# Patient Record
Sex: FEMALE | Race: ASIAN | Employment: UNEMPLOYED | URBAN - METROPOLITAN AREA
[De-identification: names, ages, dates, MRNs, and addresses within clinical notes are randomized per-mention and may not be internally consistent; named-entity substitution may affect disease eponyms.]

---

## 2021-08-22 ENCOUNTER — HOSPITAL ENCOUNTER (INPATIENT)
Facility: CLINIC | Age: 31
LOS: 3 days | Discharge: HOME | DRG: 177 | End: 2021-08-25
Attending: PHYSICIAN ASSISTANT | Admitting: INTERNAL MEDICINE
Payer: COMMERCIAL

## 2021-08-22 ENCOUNTER — APPOINTMENT (OUTPATIENT)
Dept: CT IMAGING | Facility: CLINIC | Age: 31
DRG: 177 | End: 2021-08-22
Attending: PHYSICIAN ASSISTANT
Payer: COMMERCIAL

## 2021-08-22 DIAGNOSIS — J96.01 ACUTE RESPIRATORY FAILURE WITH HYPOXIA (H): ICD-10-CM

## 2021-08-22 DIAGNOSIS — U07.1 PNEUMONIA DUE TO 2019 NOVEL CORONAVIRUS: ICD-10-CM

## 2021-08-22 DIAGNOSIS — J12.82 PNEUMONIA DUE TO 2019 NOVEL CORONAVIRUS: ICD-10-CM

## 2021-08-22 LAB
ABO/RH(D): NORMAL
ALBUMIN SERPL-MCNC: 3.4 G/DL (ref 3.4–5)
ALP SERPL-CCNC: 60 U/L (ref 40–150)
ALT SERPL W P-5'-P-CCNC: 105 U/L (ref 0–50)
ANION GAP SERPL CALCULATED.3IONS-SCNC: 7 MMOL/L (ref 3–14)
AST SERPL W P-5'-P-CCNC: 110 U/L (ref 0–45)
B-HCG FREE SERPL-ACNC: <5 IU/L (ref 0–5)
BASOPHILS # BLD AUTO: 0 10E3/UL (ref 0–0.2)
BASOPHILS NFR BLD AUTO: 0 %
BILIRUB DIRECT SERPL-MCNC: <0.1 MG/DL (ref 0–0.2)
BILIRUB SERPL-MCNC: 0.6 MG/DL (ref 0.2–1.3)
BUN SERPL-MCNC: 4 MG/DL (ref 7–30)
CALCIUM SERPL-MCNC: 9 MG/DL (ref 8.5–10.1)
CHLORIDE BLD-SCNC: 101 MMOL/L (ref 94–109)
CO2 SERPL-SCNC: 26 MMOL/L (ref 20–32)
CREAT SERPL-MCNC: 0.68 MG/DL (ref 0.52–1.04)
D DIMER PPP FEU-MCNC: 1 UG/ML FEU (ref 0–0.5)
D DIMER PPP FEU-MCNC: 1.06 UG/ML FEU (ref 0–0.5)
EOSINOPHIL # BLD AUTO: 0 10E3/UL (ref 0–0.7)
EOSINOPHIL NFR BLD AUTO: 1 %
ERYTHROCYTE [DISTWIDTH] IN BLOOD BY AUTOMATED COUNT: 13.4 % (ref 10–15)
GFR SERPL CREATININE-BSD FRML MDRD: >90 ML/MIN/1.73M2
GLUCOSE BLD-MCNC: 151 MG/DL (ref 70–99)
GLUCOSE BLDC GLUCOMTR-MCNC: 310 MG/DL (ref 70–99)
HBA1C MFR BLD: 7.5 % (ref 0–5.6)
HCT VFR BLD AUTO: 43.6 % (ref 35–47)
HGB BLD-MCNC: 14 G/DL (ref 11.7–15.7)
HOLD SPECIMEN: NORMAL
IMM GRANULOCYTES # BLD: 0 10E3/UL
IMM GRANULOCYTES NFR BLD: 1 %
LYMPHOCYTES # BLD AUTO: 1.2 10E3/UL (ref 0.8–5.3)
LYMPHOCYTES NFR BLD AUTO: 18 %
MCH RBC QN AUTO: 25 PG (ref 26.5–33)
MCHC RBC AUTO-ENTMCNC: 32.1 G/DL (ref 31.5–36.5)
MCV RBC AUTO: 78 FL (ref 78–100)
MONOCYTES # BLD AUTO: 0.6 10E3/UL (ref 0–1.3)
MONOCYTES NFR BLD AUTO: 9 %
NEUTROPHILS # BLD AUTO: 4.7 10E3/UL (ref 1.6–8.3)
NEUTROPHILS NFR BLD AUTO: 71 %
NRBC # BLD AUTO: 0 10E3/UL
NRBC BLD AUTO-RTO: 0 /100
PLATELET # BLD AUTO: 228 10E3/UL (ref 150–450)
POTASSIUM BLD-SCNC: 3.6 MMOL/L (ref 3.4–5.3)
PROT SERPL-MCNC: 8.1 G/DL (ref 6.8–8.8)
RBC # BLD AUTO: 5.61 10E6/UL (ref 3.8–5.2)
SARS-COV-2 RNA RESP QL NAA+PROBE: POSITIVE
SODIUM SERPL-SCNC: 134 MMOL/L (ref 133–144)
SPECIMEN EXPIRATION DATE: NORMAL
TROPONIN I SERPL-MCNC: <0.015 UG/L (ref 0–0.04)
WBC # BLD AUTO: 6.5 10E3/UL (ref 4–11)

## 2021-08-22 PROCEDURE — 85025 COMPLETE CBC W/AUTO DIFF WBC: CPT | Performed by: PHYSICIAN ASSISTANT

## 2021-08-22 PROCEDURE — 84484 ASSAY OF TROPONIN QUANT: CPT | Performed by: PHYSICIAN ASSISTANT

## 2021-08-22 PROCEDURE — 250N000013 HC RX MED GY IP 250 OP 250 PS 637: Performed by: INTERNAL MEDICINE

## 2021-08-22 PROCEDURE — 84145 PROCALCITONIN (PCT): CPT | Performed by: INTERNAL MEDICINE

## 2021-08-22 PROCEDURE — XW033E5 INTRODUCTION OF REMDESIVIR ANTI-INFECTIVE INTO PERIPHERAL VEIN, PERCUTANEOUS APPROACH, NEW TECHNOLOGY GROUP 5: ICD-10-PCS | Performed by: INTERNAL MEDICINE

## 2021-08-22 PROCEDURE — 250N000011 HC RX IP 250 OP 636: Performed by: INTERNAL MEDICINE

## 2021-08-22 PROCEDURE — 99223 1ST HOSP IP/OBS HIGH 75: CPT | Mod: AI | Performed by: INTERNAL MEDICINE

## 2021-08-22 PROCEDURE — 36415 COLL VENOUS BLD VENIPUNCTURE: CPT | Performed by: INTERNAL MEDICINE

## 2021-08-22 PROCEDURE — 83036 HEMOGLOBIN GLYCOSYLATED A1C: CPT | Performed by: INTERNAL MEDICINE

## 2021-08-22 PROCEDURE — 86900 BLOOD TYPING SEROLOGIC ABO: CPT | Performed by: INTERNAL MEDICINE

## 2021-08-22 PROCEDURE — 84702 CHORIONIC GONADOTROPIN TEST: CPT

## 2021-08-22 PROCEDURE — 85379 FIBRIN DEGRADATION QUANT: CPT | Performed by: INTERNAL MEDICINE

## 2021-08-22 PROCEDURE — 93005 ELECTROCARDIOGRAM TRACING: CPT

## 2021-08-22 PROCEDURE — 80048 BASIC METABOLIC PNL TOTAL CA: CPT | Performed by: PHYSICIAN ASSISTANT

## 2021-08-22 PROCEDURE — 258N000003 HC RX IP 258 OP 636: Performed by: INTERNAL MEDICINE

## 2021-08-22 PROCEDURE — 96360 HYDRATION IV INFUSION INIT: CPT | Mod: 59

## 2021-08-22 PROCEDURE — 71275 CT ANGIOGRAPHY CHEST: CPT

## 2021-08-22 PROCEDURE — 250N000012 HC RX MED GY IP 250 OP 636 PS 637: Performed by: INTERNAL MEDICINE

## 2021-08-22 PROCEDURE — 36415 COLL VENOUS BLD VENIPUNCTURE: CPT | Performed by: PHYSICIAN ASSISTANT

## 2021-08-22 PROCEDURE — 250N000009 HC RX 250: Performed by: INTERNAL MEDICINE

## 2021-08-22 PROCEDURE — 250N000011 HC RX IP 250 OP 636: Performed by: PHYSICIAN ASSISTANT

## 2021-08-22 PROCEDURE — 250N000012 HC RX MED GY IP 250 OP 636 PS 637: Performed by: PHYSICIAN ASSISTANT

## 2021-08-22 PROCEDURE — 96361 HYDRATE IV INFUSION ADD-ON: CPT

## 2021-08-22 PROCEDURE — 99285 EMERGENCY DEPT VISIT HI MDM: CPT | Mod: 25

## 2021-08-22 PROCEDURE — 87635 SARS-COV-2 COVID-19 AMP PRB: CPT | Performed by: PHYSICIAN ASSISTANT

## 2021-08-22 PROCEDURE — C9803 HOPD COVID-19 SPEC COLLECT: HCPCS

## 2021-08-22 PROCEDURE — 85379 FIBRIN DEGRADATION QUANT: CPT | Performed by: PHYSICIAN ASSISTANT

## 2021-08-22 PROCEDURE — 120N000004 HC R&B MS OVERFLOW

## 2021-08-22 PROCEDURE — 82248 BILIRUBIN DIRECT: CPT | Performed by: INTERNAL MEDICINE

## 2021-08-22 PROCEDURE — 258N000003 HC RX IP 258 OP 636: Performed by: PHYSICIAN ASSISTANT

## 2021-08-22 RX ORDER — AMLODIPINE BESYLATE 5 MG/1
5 TABLET ORAL DAILY
COMMUNITY

## 2021-08-22 RX ORDER — ALBUTEROL SULFATE 90 UG/1
2 AEROSOL, METERED RESPIRATORY (INHALATION) 4 TIMES DAILY
Status: DISCONTINUED | OUTPATIENT
Start: 2021-08-22 | End: 2021-08-25 | Stop reason: HOSPADM

## 2021-08-22 RX ORDER — IOPAMIDOL 755 MG/ML
84 INJECTION, SOLUTION INTRAVASCULAR ONCE
Status: COMPLETED | OUTPATIENT
Start: 2021-08-22 | End: 2021-08-22

## 2021-08-22 RX ORDER — DEXTROSE MONOHYDRATE 25 G/50ML
25-50 INJECTION, SOLUTION INTRAVENOUS
Status: DISCONTINUED | OUTPATIENT
Start: 2021-08-22 | End: 2021-08-25 | Stop reason: HOSPADM

## 2021-08-22 RX ORDER — FENOFIBRATE 160 MG/1
160 TABLET ORAL DAILY
Status: DISCONTINUED | OUTPATIENT
Start: 2021-08-23 | End: 2021-08-25 | Stop reason: HOSPADM

## 2021-08-22 RX ORDER — DEXAMETHASONE 4 MG/1
8 TABLET ORAL ONCE
Status: COMPLETED | OUTPATIENT
Start: 2021-08-22 | End: 2021-08-22

## 2021-08-22 RX ORDER — HYDRALAZINE HYDROCHLORIDE 100 MG/1
100 TABLET, FILM COATED ORAL EVERY 8 HOURS
Status: DISCONTINUED | OUTPATIENT
Start: 2021-08-22 | End: 2021-08-22

## 2021-08-22 RX ORDER — AMLODIPINE BESYLATE 5 MG/1
5 TABLET ORAL DAILY
Status: DISCONTINUED | OUTPATIENT
Start: 2021-08-22 | End: 2021-08-25 | Stop reason: HOSPADM

## 2021-08-22 RX ORDER — ATORVASTATIN CALCIUM 40 MG/1
40 TABLET, FILM COATED ORAL DAILY
COMMUNITY

## 2021-08-22 RX ORDER — GLIPIZIDE 5 MG/1
5 TABLET, FILM COATED, EXTENDED RELEASE ORAL
Status: DISCONTINUED | OUTPATIENT
Start: 2021-08-23 | End: 2021-08-25 | Stop reason: HOSPADM

## 2021-08-22 RX ORDER — OXYCODONE AND ACETAMINOPHEN 5; 325 MG/1; MG/1
1 TABLET ORAL EVERY 4 HOURS PRN
Status: DISCONTINUED | OUTPATIENT
Start: 2021-08-22 | End: 2021-08-22

## 2021-08-22 RX ORDER — LIDOCAINE 40 MG/G
CREAM TOPICAL
Status: DISCONTINUED | OUTPATIENT
Start: 2021-08-22 | End: 2021-08-25 | Stop reason: HOSPADM

## 2021-08-22 RX ORDER — ASPIRIN 81 MG
100 TABLET, DELAYED RELEASE (ENTERIC COATED) ORAL DAILY
Status: DISCONTINUED | OUTPATIENT
Start: 2021-08-23 | End: 2021-08-22

## 2021-08-22 RX ORDER — NICOTINE POLACRILEX 4 MG
15-30 LOZENGE BUCCAL
Status: DISCONTINUED | OUTPATIENT
Start: 2021-08-22 | End: 2021-08-25 | Stop reason: HOSPADM

## 2021-08-22 RX ORDER — FENOFIBRATE 145 MG/1
145 TABLET, COATED ORAL DAILY
COMMUNITY

## 2021-08-22 RX ORDER — LISINOPRIL 40 MG/1
40 TABLET ORAL DAILY
Status: DISCONTINUED | OUTPATIENT
Start: 2021-08-22 | End: 2021-08-25 | Stop reason: HOSPADM

## 2021-08-22 RX ORDER — LABETALOL 100 MG/1
200 TABLET, FILM COATED ORAL EVERY 12 HOURS
Status: DISCONTINUED | OUTPATIENT
Start: 2021-08-22 | End: 2021-08-22

## 2021-08-22 RX ORDER — ATORVASTATIN CALCIUM 40 MG/1
40 TABLET, FILM COATED ORAL DAILY
Status: DISCONTINUED | OUTPATIENT
Start: 2021-08-22 | End: 2021-08-25 | Stop reason: HOSPADM

## 2021-08-22 RX ORDER — LISINOPRIL 40 MG/1
40 TABLET ORAL DAILY
COMMUNITY

## 2021-08-22 RX ORDER — GLIPIZIDE 5 MG/1
5 TABLET, FILM COATED, EXTENDED RELEASE ORAL DAILY
COMMUNITY

## 2021-08-22 RX ADMIN — REMDESIVIR 200 MG: 100 INJECTION, POWDER, LYOPHILIZED, FOR SOLUTION INTRAVENOUS at 23:48

## 2021-08-22 RX ADMIN — INSULIN ASPART 3 UNITS: 100 INJECTION, SOLUTION INTRAVENOUS; SUBCUTANEOUS at 22:28

## 2021-08-22 RX ADMIN — IOPAMIDOL 84 ML: 755 INJECTION, SOLUTION INTRAVENOUS at 16:58

## 2021-08-22 RX ADMIN — AMLODIPINE BESYLATE 5 MG: 5 TABLET ORAL at 22:28

## 2021-08-22 RX ADMIN — ENOXAPARIN SODIUM 40 MG: 40 INJECTION SUBCUTANEOUS at 22:29

## 2021-08-22 RX ADMIN — SODIUM CHLORIDE 50 ML: 9 INJECTION, SOLUTION INTRAVENOUS at 23:48

## 2021-08-22 RX ADMIN — ALBUTEROL SULFATE 2 PUFF: 90 AEROSOL, METERED RESPIRATORY (INHALATION) at 22:28

## 2021-08-22 RX ADMIN — DEXAMETHASONE 8 MG: 4 TABLET ORAL at 15:47

## 2021-08-22 RX ADMIN — ATORVASTATIN CALCIUM 40 MG: 40 TABLET, FILM COATED ORAL at 22:28

## 2021-08-22 RX ADMIN — AMOXICILLIN AND CLAVULANATE POTASSIUM 1 TABLET: 875; 125 TABLET, FILM COATED ORAL at 22:28

## 2021-08-22 RX ADMIN — LISINOPRIL 40 MG: 40 TABLET ORAL at 22:28

## 2021-08-22 RX ADMIN — SODIUM CHLORIDE 1000 ML: 9 INJECTION, SOLUTION INTRAVENOUS at 15:03

## 2021-08-22 ASSESSMENT — ENCOUNTER SYMPTOMS
SHORTNESS OF BREATH: 1
VOMITING: 0
COUGH: 1
FEVER: 0
NAUSEA: 0
ABDOMINAL PAIN: 0

## 2021-08-22 ASSESSMENT — MIFFLIN-ST. JEOR: SCORE: 1848.52

## 2021-08-22 NOTE — ED TRIAGE NOTES
Patient presents to the ED with cough and SOB. Symptoms since Thursday. Was seen at urgent care this morning and diagnosed with a right sided pneumonia. COVID swab pending. Started on azithromycin and cefdinir. Patient states is feeling worse now.

## 2021-08-22 NOTE — ED PROVIDER NOTES
History     Chief Complaint:  Cough      HPI   Kay Lopez is a 30 year old female who presents emergency department for the evaluation of cough and shortness of breath.  The patient reports that her symptoms began 3 days prior and have progressively worsened prompting her visit to urgent care.  The patient reports that she had a chest x-ray performed which revealed pneumonia.  She was started on azithromycin and cefdinir.  She states that upon returning home her symptoms worsened in severity prompting her to present to the emergency department.  At the time of the exam, the patient denied chest pain however, she reported shortness of breath, fatigue, and a persistent nonproductive cough.  She denied any additional medical concerns.    Review of Systems   Constitutional: Negative for fever.   Respiratory: Positive for cough and shortness of breath.    Cardiovascular: Negative for chest pain.   Gastrointestinal: Negative for abdominal pain, nausea and vomiting.   All other systems reviewed and are negative.    Allergies:  Azithromycin  Enalapril    Medications:    Zestril  Glucophage  Lipitor   Norvasc   Tricor  Glucotrol     Apresoline   Normodyne    Past Medical History:    Hypertension  Anaphylaxis  Hypertension  Hyperlipidemia  Obesity  Erythrocytosis  Type 2 diabetes  migranes     Past Surgical History:     section    Family History:    Sister: thyroid disease      Social History:  Presents alone.  Denied tobacco use.     Physical Exam     Patient Vitals for the past 24 hrs:   BP Temp Pulse Resp SpO2   21 1715 -- -- 111 -- 94 %   21 1645 (!) 136/90 -- 107 -- 93 %   21 1630 (!) 143/95 -- 109 -- 93 %   21 1615 (!) 142/90 -- 105 -- 94 %   21 1600 (!) 137/90 -- 108 -- 94 %   21 1530 (!) 155/101 -- 117 -- 96 %   21 1515 (!) 170/108 -- 108 -- 96 %   21 1500 (!) 160/97 -- 109 -- --   21 1445 136/79 -- 110 -- 94 %   21 1430 (!) 169/102 -- 113  -- 97 %   08/22/21 1320 (!) 167/101 99.8  F (37.7  C) 112 20 95 %       Physical Exam  Vitals signs and nursing note reviewed.   HENT:      Nose: Nose normal. No congestion or rhinorrhea.     Mouth/Throat:      Mouth: Mucous membranes are moist.      Pharynx: Oropharynx is clear. No oropharyngeal exudate or posterior oropharyngeal erythema.   Eyes:      General: No scleral icterus.     Extraocular Movements: Extraocular movements intact.      Conjunctiva/sclera: Conjunctivae normal.      Pupils: Pupils are equal, round, and reactive to light.   Cardiovascular:      Rate and Rhythm: Regular rhythm. Tachycardia.      Pulses: Normal pulses.      Heart sounds: Normal heart sounds.   Pulmonary:      Effort: Pulmonary effort is normal. Diminished breath sounds right lower lobe.   Abdominal:      General: Abdomen is flat. Bowel sounds are normal.      Palpations: Abdomen is soft.      Tenderness: There is no abdominal tenderness.   Musculoskeletal: Normal range of motion.      Right lower leg: No edema.      Left lower leg: No edema.   Skin:     General: Skin is warm and dry.   Neurological:      Mental Status: Alert. Speech normal. Responds appropriately to questions.   Psychiatric:         Mood and Affect: Mood normal.         Behavior: Behavior normal.     Emergency Department Course   ECG:  ECG taken at 1528, ECG read at 1535  Sinus tachycardia  Otherwise normal ECG   No significant change as compared to prior, dated 7/1/15.  Rate 110 bpm. IA interval 146 ms. QRS duration 98 ms. QT/QTc 354/479 ms. P-R-T axes 19 42 1.     Imaging:  CT Chest Pulmonary Embolism w Contrast   Final Result   IMPRESSION:   1.  Areas of groundglass opacity and developing consolidation most prominent in the right lower lobe with right hilar adenopathy likely infectious or inflammatory. Follow-up recommended in 3 months to ensure resolution and exclude other underlying    pathology.   2.  Hepatic steatosis.   3.  No pulmonary embolus, aortic  aneurysm or dissection.      Commonly reported imaging features of (COVID-19) pneumonia are present. Influenza pneumonia and organizing pneumonia as can be seen in the setting of drug toxicity and connective tissue disease can cause a similar imaging pattern.          Laboratory:  Ddimer: 1.6 (H)  BMP: urea nitrogen 4 (L), glucose 151 (H) o/w WNL (Creatinine 0.68)   CBC: WBC 6.5, HGB 14,   Troponin (Collected 1500): <0.015  Symptomatic SARS-CoV2 (COVID19) Virus PCR Multiplex: Positive   HCG quantitative pregnancy POCT: <5    Emergency Department Course:    Reviewed:  I reviewed nursing notes, vitals, past history and care everywhere    Assessments:  1526 I obtained history and examined the patient as noted above.   1647 I rechecked the patient and explained findings.   1750 I staffed the patient with Dr. Reaves.       Consults:   1832   I spoke with Dr. Orozco of the hospitalist service who graciously accepted the patient for admission.     Interventions:  Medications   0.9% sodium chloride BOLUS (1,000 mLs Intravenous New Bag 8/22/21 1503)   dexamethasone (DECADRON) tablet 8 mg (8 mg Oral Given 8/22/21 1547)   iopamidol (ISOVUE-370) solution 84 mL (84 mLs Intravenous Given 8/22/21 1658)   sodium chloride (PF) 0.9% PF flush 100 mL (100 mLs Intravenous Given 8/22/21 1658)       Disposition:  The patient was admitted to the hospital under the care of Dr. Orozco.    Impression & Plan           Medical Decision Making:  Kay Lopez is a-year-old female who presents to the emergency department for evaluation of a persistent cough and shortness of breath beginning 3 days prior.  Vitals reviewed.  Patient afebrile however she was found to be tachycardic.  EKG revealed sinus tachycardia without evidence of acute ischemic changes.  Troponin negative.  CBC demonstrated no evidence of leukocytosis.  Hemoglobin stable at 14.0.  BMP reveals a nonfasting glucose of 151 otherwise unremarkable.  Covid is positive.  Given  tachycardia and low oxygen saturation, a D-dimer was obtained which unfortunately was elevated.  CT PE study was negative for PE, aortic aneurysm, or dissection however there was evidence of groundglass opacities and developing consolidation most prominent in the right lower lobe.  An ambulatory trial was performed and patient's oxygen saturation dropped to the low 90s and patient became increasingly tachycardic and more short of breath.  I spoke with Dr. Kim of the hospitalist team who graciously excepted the patient for admission given evidence of acute respiratory failure in the setting of Covid pneumonia.  While in the emergency department, the patient was given Decadron.  All questions and concerns were addressed prior to admission.    This note was completed in part using Dragon voice recognition software. Although reviewed after completion, some word and grammatical errors may occur.     Covid-19  Kay Lopez was evaluated during a global COVID-19 pandemic, which necessitated consideration that the patient might be at risk for infection with the SARS-CoV-2 virus that causes COVID-19.   Applicable protocols for evaluation were followed during the patient's care.   COVID-19 was considered as part of the patient's evaluation. The plan for testing is:  a test was obtained during this visit. Covid positive.     Diagnosis:    ICD-10-CM    1. Acute respiratory failure with hypoxia (H)  J96.01    2. Pneumonia due to 2019 novel coronavirus  U07.1     J12.82        Discharge Medications:  New Prescriptions    No medications on file         Scribe Disclosure:  Nav LEHMAN, am serving as a scribe at 2:08 PM on 8/22/2021 to document services personally performed by Katelynn Liu PA-C  based on my observations and the provider's statements to me.      Katelynn Liu PA-C  08/22/21 0682

## 2021-08-22 NOTE — H&P
[facility]    History and Physical - Hospitalist Service       Date of Admission:  8/22/2021    Assessment & Plan      Kay Lopez is a 30 year old female admitted on 8/22/2021.     COVID-19 infection  Bilateral pneumonia due to COVID-19 infection  Patient was diagnosed with Covid on  We will start the patient on Remdesivir and Dexamethazone with Lung infiltrates though the Hypoxia was only once less than 94%  Pro-calcitonin will be checked and if the level is normal I will discontinue Augmentin that was already started     Hypertension  Continue home dose of hydralazine 100 mg every 8 hours as well as labetalol 200 mg every 12 hours    Type 2 diabetes  Sliding scale insulin will be given    migranes   No headache at this time    Hyperlipidemia  Obesity         Diet:  Diabetic diet  DVT Prophylaxis: Enoxaparin (Lovenox) SQ  Wilkerson Catheter: Not present  Central Lines: None  Code Status:  Full code    Clinically Significant Risk Factors Present on Admission                       Bhanu Orozco MD  [facility]  Securely message with the Vocera Web Console (learn more here)  Text page via [employer] Paging/Directory      ______________________________________________________________________    Chief Complaint   Hartness of breath    History is obtained from the patient's  (because of language barrier with the patient), medical records and my discussion with the emergency department physician.    History of Present Illness   Kay Lopez is a 30 year old lady who has vaccinated for COVID-19 infection on March 15 and April 12.  She has history of hypertension, hypercholesteremia, obesity, type 2 diabetes mellitus and migraine    For the past 4 days patient has had cough with aches and pains in the body.  She has associated shortness of breath associated with the cough which was progressively getting worse.  This prompted the patient to go to urgent care earlier in  the morning at [facility].  X-ray was done which showed left lower lobe pneumonia a Covid swab was checked and patient was sent home on azithromycin and cefdinir.  At home health cough was significantly getting worse and so was her shortness of breath because of which she was brought again to the emergency department at [facility].     Here her COVID-19 test came back positive, creatinine was 0.68, WBC 6.5, D-dimer 1.06 and CT scan of the chest showed areas of groundglass opacities and developing consolidation most prominent in the right lower lobe with right hilar adenopathy likely infectious or inflammatory.         Review of Systems    Unable to get review of systems from the patient because of language barrier.     Past Medical History    I have reviewed this patient's medical history and updated it with pertinent information if needed.   Past Medical History:   Diagnosis Date     Hypertension     history of in [place], took medicine then, no medicine in last 2 [place] the states       Past Surgical History   I have reviewed this patient's surgical history and updated it with pertinent information if needed.  Past Surgical History:   Procedure Laterality Date      SECTION N/A 2015    Procedure:  SECTION;  Surgeon: Clifton Lopez MD;  Location:  L+D     GYN SURGERY             Social History   I have reviewed this patient's social history and updated it with pertinent information if needed.  Social History     Tobacco Use     Smoking status: Never Smoker   Substance Use Topics     Alcohol use: No     Drug use: Not on file       Family History   No one is sick in the family with Covid    Prior to Admission Medications   Prior to Admission Medications   Prescriptions Last Dose Informant Patient Reported? Taking?   Lactobacillus (ACIDOPHILUS)   No No   Sig: Take 1 tablet by mouth 2 times daily   Misc. Devices (BREAST PUMP) MISC   No No   Si each  as needed   amoxicillin-clavulanate (AUGMENTIN) 875-125 MG per tablet   No No   Sig: Take 1 tablet by mouth 2 times daily   docusate sodium (COLACE) 100 MG tablet   No No   Sig: Take 100 mg by mouth daily   hydrALAZINE (APRESOLINE) 100 MG TABS   No No   Sig: Take 1 tablet (100 mg) by mouth every 8 hours   ibuprofen (ADVIL,MOTRIN) 800 MG tablet   No No   Sig: Take 1 tablet (800 mg) by mouth every 6 hours as needed for moderate pain   labetalol (NORMODYNE) 200 MG tablet   No No   Sig: Take 1 tablet (200 mg) by mouth every 12 hours   oxyCODONE-acetaminophen (PERCOCET) 5-325 MG per tablet   No No   Sig: Take 1 tablet by mouth every 4 hours as needed for moderate to severe pain      Facility-Administered Medications: None     Allergies   Allergies   Allergen Reactions     Azithromycin      anaphylaxis       Physical Exam   Vital Signs: Temp: 99.8  F (37.7  C)   BP: (!) 136/90 Pulse: 111   Resp: 20 SpO2: 94 %      Weight: 0 lbs 0 oz        Physical Exam   Vital Signs: Temp: 98.8  F (37.1  C) Temp src: Oral BP: (!) 144/88 Pulse: 114   Resp: 20 SpO2: 94 % O2 Device: None (Room air)    Weight: 252 lbs 1.6 oz      GENERAL: Patient is not  in acute distress  HEENT:  EOM+,Conjunctiva is clear    NECK:   no Jugular Venous distention  HEART: S1 S2  regular Rate and Rhythm,  there is   no murmur,   LUNGS: Respirations are   not laboured, Lungs are   clear to auscultation  without Crepitations or Wheezing   ABDOMEN: Soft , there is no tenderness ,Bowel Sounds are  Positive   LOWER LIMBS: no   Pedal Edema   Bilaterally   CNS:   Alert,   Oriented x 3, Moving all the Four Limbs      Data   Recent Labs   Lab 08/22/21  1500   WBC 6.5   HGB 14.0   MCV 78         POTASSIUM 3.6   CHLORIDE 101   CO2 26   BUN 4*   CR 0.68   ANIONGAP 7   LAURA 9.0   *   TROPONIN <0.015         Recent Results (from the past 24 hour(s))   CT Chest Pulmonary Embolism w Contrast    Narrative    EXAM: CT CHEST PULMONARY EMBOLISM W  CONTRAST  LOCATION: [facility]  DATE/TIME: 8/22/2021 4:56 PM    INDICATION: Shortness of breath, cough, tachycardia.  COMPARISON: None.  TECHNIQUE: CT chest pulmonary angiogram during arterial phase injection of IV contrast. Multiplanar reformats and MIP reconstructions were performed. Dose reduction techniques were used.   CONTRAST: 84 mL Isovue-370.    FINDINGS:  ANGIOGRAM CHEST: Exam mildly compromised from body habitus and prominent motion artifact at the lung bases. Smaller pulmonary arteries obscured. No pulmonary emboli identified. Thoracic aorta is negative for dissection. No CT evidence of right heart   strain.    LUNGS AND PLEURA: Extensive groundglass opacity and developing consolidation most prominent in right lower lobe and to a much less extent the remainder of bilateral hemithoraces. No effusion.    MEDIASTINUM/AXILLAE: Mild right hilar adenopathy.    CORONARY ARTERY CALCIFICATION: None.    UPPER ABDOMEN: Hepatic steatosis.    MUSCULOSKELETAL: Normal.      Impression    IMPRESSION:  1.  Areas of groundglass opacity and developing consolidation most prominent in the right lower lobe with right hilar adenopathy likely infectious or inflammatory. Follow-up recommended in 3 months to ensure resolution and exclude other underlying   pathology.  2.  Hepatic steatosis.  3.  No pulmonary embolus, aortic aneurysm or dissection.    Commonly reported imaging features of (COVID-19) pneumonia are present. Influenza pneumonia and organizing pneumonia as can be seen in the setting of drug toxicity and connective tissue disease can cause a similar imaging pattern.         Data   Data reviewed today: I reviewed all medications, new labs and imaging results over the last 24 hours.           Most Recent 3 CBC's:Recent Labs   Lab Test 08/22/21  1500 07/03/15  0219 07/02/15  0519   WBC 6.5 17.0* 18.0*   HGB 14.0 10.5* 11.2*   MCV 78 75* 74*    365 331     Most Recent 3 BMP's:Recent Labs   Lab  Test 08/22/21  1500 07/04/15  0522 07/03/15  0219 07/02/15  1554 07/01/15  1921     --   --  142 141   POTASSIUM 3.6  --   --  3.8 3.3*   CHLORIDE 101  --   --  109 106   CO2 26  --   --  21 26   BUN 4*  --   --  10 7   CR 0.68 0.74 0.63 0.59 0.62   ANIONGAP 7  --   --  12 9   LAURA 9.0  --   --  8.3* 8.3*   *  --   --  128* 88     Most Recent 2 LFT's:Recent Labs   Lab Test 07/01/15  1921   AST 29   ALT 29   ALKPHOS 133   BILITOTAL 0.3     Most Recent D-dimer:Recent Labs   Lab Test 08/22/21  1500   DD 1.06*     Recent Results (from the past 24 hour(s))   CT Chest Pulmonary Embolism w Contrast    Narrative    EXAM: CT CHEST PULMONARY EMBOLISM W CONTRAST  LOCATION: [facility]  DATE/TIME: 8/22/2021 4:56 PM    INDICATION: Shortness of breath, cough, tachycardia.  COMPARISON: None.  TECHNIQUE: CT chest pulmonary angiogram during arterial phase injection of IV contrast. Multiplanar reformats and MIP reconstructions were performed. Dose reduction techniques were used.   CONTRAST: 84 mL Isovue-370.    FINDINGS:  ANGIOGRAM CHEST: Exam mildly compromised from body habitus and prominent motion artifact at the lung bases. Smaller pulmonary arteries obscured. No pulmonary emboli identified. Thoracic aorta is negative for dissection. No CT evidence of right heart   strain.    LUNGS AND PLEURA: Extensive groundglass opacity and developing consolidation most prominent in right lower lobe and to a much less extent the remainder of bilateral hemithoraces. No effusion.    MEDIASTINUM/AXILLAE: Mild right hilar adenopathy.    CORONARY ARTERY CALCIFICATION: None.    UPPER ABDOMEN: Hepatic steatosis.    MUSCULOSKELETAL: Normal.      Impression    IMPRESSION:  1.  Areas of groundglass opacity and developing consolidation most prominent in the right lower lobe with right hilar adenopathy likely infectious or inflammatory. Follow-up recommended in 3 months to ensure resolution and exclude other underlying    pathology.  2.  Hepatic steatosis.  3.  No pulmonary embolus, aortic aneurysm or dissection.    Commonly reported imaging features of (COVID-19) pneumonia are present. Influenza pneumonia and organizing pneumonia as can be seen in the setting of drug toxicity and connective tissue disease can cause a similar imaging pattern.

## 2021-08-22 NOTE — ED PROVIDER NOTES
Emergency Department Attending Supervision Note  8/22/2021  6:09 PM      I evaluated this patient in conjunction with Katelynn Liu PA-C      Briefly, the patient presented with cough and shortness of breath.  COVID +      Exam:    Gen: alert  CV: tachycardic regular rhythm, no murmurs, 2+ distal pulses in all 4 extremities    Pulm: breath sounds equal, lungs clear  MSK: no lower extremity edema, no calf tenderness  Neuro: alert, appropriate conversation and interaction        MDM and Plan:  Patient presents for cough and SOB.  Evaluation today showed respiratory failure due to COVID PNA.  PE study negative.  Pt with borderline desaturation with ambulation but significant tachycardia with ambulation even after IV fluids (on my bedside assessment  with standing at bedside).  Steroids given.  Plan for admission.      Diagnosis    ICD-10-CM    1. Acute respiratory failure with hypoxia (H)  J96.01    2. Pneumonia due to 2019 novel coronavirus  U07.1     J12.82             Tess Glaser MD  08/22/21 2009

## 2021-08-23 LAB
ANION GAP SERPL CALCULATED.3IONS-SCNC: 8 MMOL/L (ref 3–14)
ATRIAL RATE - MUSE: 110 BPM
BUN SERPL-MCNC: 10 MG/DL (ref 7–30)
CALCIUM SERPL-MCNC: 8.9 MG/DL (ref 8.5–10.1)
CHLORIDE BLD-SCNC: 106 MMOL/L (ref 94–109)
CO2 SERPL-SCNC: 21 MMOL/L (ref 20–32)
CREAT SERPL-MCNC: 0.6 MG/DL (ref 0.52–1.04)
CRP SERPL-MCNC: 46.8 MG/L (ref 0–8)
D DIMER PPP FEU-MCNC: 0.96 UG/ML FEU (ref 0–0.5)
DIASTOLIC BLOOD PRESSURE - MUSE: NORMAL MMHG
ERYTHROCYTE [DISTWIDTH] IN BLOOD BY AUTOMATED COUNT: 13.6 % (ref 10–15)
FIBRINOGEN PPP-MCNC: 677 MG/DL (ref 170–490)
GFR SERPL CREATININE-BSD FRML MDRD: >90 ML/MIN/1.73M2
GLUCOSE BLD-MCNC: 208 MG/DL (ref 70–99)
GLUCOSE BLDC GLUCOMTR-MCNC: 156 MG/DL (ref 70–99)
GLUCOSE BLDC GLUCOMTR-MCNC: 219 MG/DL (ref 70–99)
GLUCOSE BLDC GLUCOMTR-MCNC: 261 MG/DL (ref 70–99)
GLUCOSE BLDC GLUCOMTR-MCNC: 316 MG/DL (ref 70–99)
GLUCOSE BLDC GLUCOMTR-MCNC: 349 MG/DL (ref 70–99)
HCT VFR BLD AUTO: 41.5 % (ref 35–47)
HGB BLD-MCNC: 13.2 G/DL (ref 11.7–15.7)
HOLD SPECIMEN: NORMAL
INTERPRETATION ECG - MUSE: NORMAL
MCH RBC QN AUTO: 24.3 PG (ref 26.5–33)
MCHC RBC AUTO-ENTMCNC: 31.8 G/DL (ref 31.5–36.5)
MCV RBC AUTO: 76 FL (ref 78–100)
P AXIS - MUSE: 19 DEGREES
PLATELET # BLD AUTO: 232 10E3/UL (ref 150–450)
POTASSIUM BLD-SCNC: 4.3 MMOL/L (ref 3.4–5.3)
PR INTERVAL - MUSE: 146 MS
PROCALCITONIN SERPL-MCNC: <0.05 NG/ML
PROCALCITONIN SERPL-MCNC: <0.05 NG/ML
QRS DURATION - MUSE: 98 MS
QT - MUSE: 354 MS
QTC - MUSE: 479 MS
R AXIS - MUSE: 42 DEGREES
RBC # BLD AUTO: 5.43 10E6/UL (ref 3.8–5.2)
SODIUM SERPL-SCNC: 135 MMOL/L (ref 133–144)
SYSTOLIC BLOOD PRESSURE - MUSE: NORMAL MMHG
T AXIS - MUSE: 1 DEGREES
VENTRICULAR RATE- MUSE: 110 BPM
WBC # BLD AUTO: 10 10E3/UL (ref 4–11)

## 2021-08-23 PROCEDURE — 250N000013 HC RX MED GY IP 250 OP 250 PS 637: Performed by: INTERNAL MEDICINE

## 2021-08-23 PROCEDURE — 250N000009 HC RX 250: Performed by: INTERNAL MEDICINE

## 2021-08-23 PROCEDURE — 85027 COMPLETE CBC AUTOMATED: CPT | Performed by: INTERNAL MEDICINE

## 2021-08-23 PROCEDURE — 84145 PROCALCITONIN (PCT): CPT | Performed by: INTERNAL MEDICINE

## 2021-08-23 PROCEDURE — 85384 FIBRINOGEN ACTIVITY: CPT | Performed by: INTERNAL MEDICINE

## 2021-08-23 PROCEDURE — 250N000012 HC RX MED GY IP 250 OP 636 PS 637: Performed by: INTERNAL MEDICINE

## 2021-08-23 PROCEDURE — 99233 SBSQ HOSP IP/OBS HIGH 50: CPT | Performed by: INTERNAL MEDICINE

## 2021-08-23 PROCEDURE — 94640 AIRWAY INHALATION TREATMENT: CPT

## 2021-08-23 PROCEDURE — 85379 FIBRIN DEGRADATION QUANT: CPT | Performed by: INTERNAL MEDICINE

## 2021-08-23 PROCEDURE — 80048 BASIC METABOLIC PNL TOTAL CA: CPT | Performed by: INTERNAL MEDICINE

## 2021-08-23 PROCEDURE — 999N000157 HC STATISTIC RCP TIME EA 10 MIN

## 2021-08-23 PROCEDURE — 120N000004 HC R&B MS OVERFLOW

## 2021-08-23 PROCEDURE — 36415 COLL VENOUS BLD VENIPUNCTURE: CPT | Performed by: INTERNAL MEDICINE

## 2021-08-23 PROCEDURE — 94640 AIRWAY INHALATION TREATMENT: CPT | Mod: 76

## 2021-08-23 PROCEDURE — 250N000011 HC RX IP 250 OP 636: Performed by: INTERNAL MEDICINE

## 2021-08-23 PROCEDURE — 86140 C-REACTIVE PROTEIN: CPT | Performed by: INTERNAL MEDICINE

## 2021-08-23 PROCEDURE — 258N000003 HC RX IP 258 OP 636: Performed by: INTERNAL MEDICINE

## 2021-08-23 RX ORDER — BENZONATATE 100 MG/1
100 CAPSULE ORAL 3 TIMES DAILY PRN
Status: DISCONTINUED | OUTPATIENT
Start: 2021-08-23 | End: 2021-08-25 | Stop reason: HOSPADM

## 2021-08-23 RX ADMIN — ENOXAPARIN SODIUM 40 MG: 40 INJECTION SUBCUTANEOUS at 19:57

## 2021-08-23 RX ADMIN — REMDESIVIR 100 MG: 100 INJECTION, POWDER, LYOPHILIZED, FOR SOLUTION INTRAVENOUS at 17:51

## 2021-08-23 RX ADMIN — INSULIN HUMAN 10 UNITS: 100 INJECTION, SUSPENSION SUBCUTANEOUS at 14:04

## 2021-08-23 RX ADMIN — METFORMIN HYDROCHLORIDE 1000 MG: 500 TABLET, FILM COATED ORAL at 17:59

## 2021-08-23 RX ADMIN — GLIPIZIDE 5 MG: 5 TABLET, FILM COATED, EXTENDED RELEASE ORAL at 08:59

## 2021-08-23 RX ADMIN — ENOXAPARIN SODIUM 40 MG: 40 INJECTION SUBCUTANEOUS at 09:00

## 2021-08-23 RX ADMIN — AMOXICILLIN AND CLAVULANATE POTASSIUM 1 TABLET: 875; 125 TABLET, FILM COATED ORAL at 09:00

## 2021-08-23 RX ADMIN — DEXAMETHASONE 6 MG: 2 TABLET ORAL at 08:59

## 2021-08-23 RX ADMIN — LISINOPRIL 40 MG: 40 TABLET ORAL at 09:00

## 2021-08-23 RX ADMIN — SODIUM CHLORIDE 50 ML: 9 INJECTION, SOLUTION INTRAVENOUS at 17:53

## 2021-08-23 RX ADMIN — GUAIFENESIN 10 ML: 100 SOLUTION ORAL at 12:49

## 2021-08-23 RX ADMIN — FENOFIBRATE 160 MG: 160 TABLET, FILM COATED ORAL at 09:00

## 2021-08-23 RX ADMIN — BENZONATATE 100 MG: 100 CAPSULE ORAL at 17:59

## 2021-08-23 RX ADMIN — ALBUTEROL SULFATE 2 PUFF: 90 AEROSOL, METERED RESPIRATORY (INHALATION) at 15:47

## 2021-08-23 RX ADMIN — GUAIFENESIN 10 ML: 100 SOLUTION ORAL at 17:59

## 2021-08-23 RX ADMIN — AMLODIPINE BESYLATE 5 MG: 5 TABLET ORAL at 09:00

## 2021-08-23 RX ADMIN — ATORVASTATIN CALCIUM 40 MG: 40 TABLET, FILM COATED ORAL at 19:56

## 2021-08-23 RX ADMIN — ALBUTEROL SULFATE 2 PUFF: 90 AEROSOL, METERED RESPIRATORY (INHALATION) at 19:44

## 2021-08-23 RX ADMIN — ALBUTEROL SULFATE 2 PUFF: 90 AEROSOL, METERED RESPIRATORY (INHALATION) at 07:59

## 2021-08-23 RX ADMIN — ALBUTEROL SULFATE 2 PUFF: 90 AEROSOL, METERED RESPIRATORY (INHALATION) at 11:01

## 2021-08-23 NOTE — PLAN OF CARE
ROOM # 231    Living Situation (if not independent, order SW consult): With  and children  Facility name:  : [facility] ([phone])    Activity level at baseline: Ind  Activity level on admit: Ind      Patient registered to observation; given Patient Bill of Rights; given the opportunity to ask questions about observation status and their plan of care.  Patient has been oriented to the observation room, bathroom and call light is in place.    Discussed discharge goals and expectations with patient/family.

## 2021-08-23 NOTE — ED NOTES
[facility]  ED Nurse Handoff Report    Kay Lopez is a 30 year old female   ED Chief complaint: Cough  . ED Diagnosis:   Final diagnoses:   Acute respiratory failure with hypoxia (H)   Pneumonia due to 2019 novel coronavirus     Allergies:   Allergies   Allergen Reactions     Azithromycin      anaphylaxis       Code Status: Full Code  Activity level - Baseline/Home:  Independent. Activity Level - Current:   Stand by Assist. Lift room needed: No. Bariatric: No   Needed: Yes   Isolation: Yes. Infection: Not Applicable.     Vital Signs:   Vitals:    08/22/21 1845 08/22/21 1900 08/22/21 1915 08/22/21 1930   BP:       Pulse:       Resp:       Temp:       SpO2: 94% 94% 92% 93%       Cardiac Rhythm:  ,      Pain level:    Patient confused: No. Patient Falls Risk: Yes.   Elimination Status: Has voided   Patient Report - Initial Complaint: cough. Focused Assessment: cough that's increasingly getting more freq/dry/congested. A&O   Tests Performed: lab/imaging. Abnormal Results:   Labs Ordered and Resulted from Time of ED Arrival Up to the Time of Departure from the ED   BASIC METABOLIC PANEL - Abnormal; Notable for the following components:       Result Value    Urea Nitrogen 4 (*)     Glucose 151 (*)     All other components within normal limits   COVID-19 VIRUS (CORONAVIRUS) BY PCR - Abnormal; Notable for the following components:    SARS CoV2 PCR Positive (*)     All other components within normal limits    Narrative:     Testing was performed using the treasure  SARS-CoV-2 & Influenza A/B Assay on the treasure  Jennifer  System.  This test should be ordered for the detection of SARS-COV-2 in individuals who meet SARS-CoV-2 clinical and/or epidemiological criteria. Test performance is unknown in asymptomatic patients.  This test is for in vitro diagnostic use under the FDA EUA for laboratories certified under CLIA to perform moderate and/or high complexity testing. This test has not been FDA cleared  or approved.  A negative test does not rule out the presence of PCR inhibitors in the specimen or target RNA in concentration below the limit of detection for the assay. The possibility of a false negative should be considered if the patient's recent exposure or clinical presentation suggests COVID-19.  [facility] are certified under the Clinical Laboratory Improvement Amendments of 1988 (CLIA-88) as qualified to perform moderate and/or high complexity laboratory testing.   D DIMER QUANTITATIVE - Abnormal; Notable for the following components:    D-Dimer Quantitative 1.06 (*)     All other components within normal limits    Narrative:     This D-dimer assay is intended for use in conjunction with a clinical pretest probability assessment model to exclude pulmonary embolism (PE) and deep venous thrombosis (DVT) in outpatients suspected of PE or DVT. The cut-off value is 0.50 ug/mL FEU.   CBC WITH PLATELETS AND DIFFERENTIAL - Abnormal; Notable for the following components:    RBC Count 5.61 (*)     MCH 25.0 (*)     All other components within normal limits   ISTAT HCG QUANTITATIVE PREGNANCY POCT - Normal   TROPONIN I - Normal   CBC WITH PLATELETS & DIFFERENTIAL    Narrative:     The following orders were created for panel order CBC with platelets differential.  Procedure                               Abnormality         Status                     ---------                               -----------         ------                     CBC with platelets and d...[[id number]]  Abnormal            Final result                 Please view results for these tests on the individual orders.   EXTRA BLUE TOP TUBE   EXTRA RED TOP TUBE   EXTRA GREEN TOP (LITHIUM HEPARIN) TUBE   EXTRA PURPLE TOP TUBE   ISTAT HCG QUANTITATIVE PREGNANCY NURSING POCT   EXTRA TUBE    Narrative:     The following orders were created for panel order Carpenter Draw.  Procedure                               Abnormality         Status                      ---------                               -----------         ------                     Extra Blue Top Tube[[id number]]                              Final result               Extra Red Top Tube[[id number]]                               Final result               Extra Green Top (Lithium...[[id number]]                      Final result               Extra Purple Top Tube[[id number]]                            Final result                 Please view results for these tests on the individual orders.     .   Treatments provided: MAR  Family Comments: none  OBS brochure/video discussed/provided to patient:  N/A  ED Medications:   Medications   0.9% sodium chloride BOLUS (1,000 mLs Intravenous New Bag 8/22/21 1503)   dexamethasone (DECADRON) tablet 8 mg (8 mg Oral Given 8/22/21 1547)   iopamidol (ISOVUE-370) solution 84 mL (84 mLs Intravenous Given 8/22/21 1658)   sodium chloride (PF) 0.9% PF flush 100 mL (100 mLs Intravenous Given 8/22/21 1658)     Drips infusing:  Yes  For the majority of the shift, the patient's behavior Green. Interventions performed were .    Sepsis treatment initiated: No     Patient tested for COVID 19 prior to admission: YES    ED Nurse Name/Phone Number: Gayle Gillespie RN,   7:37 PM    RECEIVING UNIT ED HANDOFF REVIEW    Above ED Nurse Handoff Report was reviewed: Yes  Reviewed by: Echo Carver RN on August 22, 2021 at 8:07 PM

## 2021-08-23 NOTE — PLAN OF CARE
"Vitals: /64 (BP Location: Right arm)   Pulse 89   Temp 97.8  F (36.6  C) (Oral)   Resp 20   Ht 1.626 m (5' 4\")   Wt 114.4 kg (252 lb 1.6 oz)   SpO2 94%   BMI 43.27 kg/m      Neuro: WNL, A&O x4  Cardiac: WNL  Lungs: 1L O2, sats 92-94%  GI: WNL  : WNL  Skin: WNL  Activity: Ind  Diet: Regular  Pain: Denies  IV: Peripheral SL   Meds: Remdesivir, Decadron, lovenox, sliding scale insulin  Labs/tests: Chest CT shows COVID 19 pneumonia with ground glass opacities, worst in RLL  Misc: Peruvian speaking, requires    Plan: O2, IV Remdesivir, PO decadron, and symptom management. Will provide supportive care.      "

## 2021-08-23 NOTE — PROGRESS NOTES
Vitals: pt tachy 100s/110s, on 3L NC   Neuro: wnl   GI: wnl-denies diarrhea   : wnl  Skin:wnl  Activity: ind  Diet:Regular, family dropping off food. Denies lost of taste or smell. Tolerating diet.   Pain: denies   Plan: wean 02, remdesivir, oral decadron, scheduled inhalers, continue pulse ox

## 2021-08-23 NOTE — PROGRESS NOTES
[facility]  Hospitalist Progress Note  Parminder Daley MD 08/23/21    Reason for Stay (Diagnosis): COVID19 pneumonia         Assessment and Plan:      Summary of Stay: Kay Lopez is a 30 year old [place] speaking female with past medical history of type II DM, HTN, obesity, and HLD admitted on 8/22/2021 with 3 days of cough and shortness of breath and found to be positive for COVID-19.  Initially presented to a different ER and was prescribed cefdinir and azithromycin for pneumonia.  Return due to worsening symptoms.  D-dimer elevated at 1.  CTPA showed bilateral groundglass opacities, most prominent in the right lower lobe with right hilar adenopathy.  She was admitted due to hypoxia.  She has been started on IV Remdesevir and dexamethasone.    Problem List/Assessment and Plan:   Acute hypoxemic respiratory failure secondary to COVID-19 pneumonia: Ill for 3 days PTA.  Primary symptoms of cough and shortness of breath.  Positive for COVID-19 8/22.  CTPA shows bilateral groundglass opacities, most prominent in the right lower lobe consistent with COVID-19 pneumonia.  -Continue IV remdesivir day 2/5, mild elevation  and  on presentation, trend LFTs while on Remdesevir daily  -Continue oral dexamethasone 6 mg daily day 2/10  -Continue supplemental oxygen, wean as able, pulse oximetry  -Guaifenesin Robitussin as needed for cough  -Lovenox for DVT prophylaxis    Type II DM: PTA on Metformin 1000 mg twice daily and glipizide XL 5 mg daily.  A1c 7.5.  Risk for hyperglycemia with dexamethasone.  Tolerating p.o. now.  -Continue glipizide  -Resume Metformin this evening  -Medium dose sliding scale insulin  -Depending on degree of hyperglycemia may need to add NPH tomorrow morning with dexamethasone dose  ADDENDUM:  BG up to 349.  Start NPH 10 units daily with dexamethasone, give dose now    HTN: Resume PTA lisinopril 40 mg daily and amlodipine 5 mg daily.    Obesity: BMI 43.    HLD: Resume  "fenofibrate and atorvastatin.      DVT Prophylaxis: Enoxaparin (Lovenox) SQ  Code Status: Full Code  FEN: Regular diet  Discharge Dispo: Home  Estimated Disch Date / # of Days until Disch: Pending ability to wean oxygen, likely at least 2 additional days    Due to language barrier professional iPad  utilized for entire encounter.        Interval History (Subjective):      Assumed care today.  Admitted yesterday for respiratory failure secondary COVID-19.  Currently on 3 L via nasal cannula and appears comfortable.  Remains mildly tachycardic.  Currently afebrile.  She does feel short of breath with moving from the bed to the chair, but is otherwise comfortable at rest.  Cough has been minimal.  Denies any myalgias.  Tolerating p.o.                  Physical Exam:      Last Vital Signs:  /70 (BP Location: Right arm)   Pulse 103   Temp 98.2  F (36.8  C) (Oral)   Resp 16   Ht 1.626 m (5' 4\")   Wt 114.4 kg (252 lb 1.6 oz)   SpO2 91%   BMI 43.27 kg/m      No intake or output data in the 24 hours ending 08/23/21 1041    Constitutional: Awake, NAD  Eyes: sclera white  HEENT:  MMM  Respiratory: Few scattered bilateral crackles, no wheeze, appears comfortable on 3 L via nasal cannula  Cardiovascular: Mild regular tachycardia without murmur  GI: Obese, non-tender, not distended, bowel sounds present  Skin: no rash or lesions, acyanotic  Musculoskeletal/extremities: No lower extremity edema  Neurologic: A&O, answering questions appropriately  Psychiatric: calm, cooperative, normal affect         Medications:      All current medications were reviewed with changes reflected in problem list.         Data:      All new lab and imaging data was reviewed.   Labs:  Recent Labs   Lab 08/23/21  0907 08/23/21  0341 08/22/21  2139 08/22/21  1500   NA  --   --   --  134   POTASSIUM  --   --   --  3.6   CHLORIDE  --   --   --  101   CO2  --   --   --  26   ANIONGAP  --   --   --  7   * 261* 310* 151*   BUN  " --   --   --  4*   CR  --   --   --  0.68   GFRESTIMATED  --   --   --  >90   LAURA  --   --   --  9.0     Recent Labs   Lab 08/22/21  1500   WBC 6.5   HGB 14.0   HCT 43.6   MCV 78        Recent Labs   Lab 08/23/21  1003   DD 0.96*     Procalcitonin undetectable    Imaging:   Recent Results (from the past 24 hour(s))   CT Chest Pulmonary Embolism w Contrast    Narrative    EXAM: CT CHEST PULMONARY EMBOLISM W CONTRAST  LOCATION: [facility]  DATE/TIME: 8/22/2021 4:56 PM    INDICATION: Shortness of breath, cough, tachycardia.  COMPARISON: None.  TECHNIQUE: CT chest pulmonary angiogram during arterial phase injection of IV contrast. Multiplanar reformats and MIP reconstructions were performed. Dose reduction techniques were used.   CONTRAST: 84 mL Isovue-370.    FINDINGS:  ANGIOGRAM CHEST: Exam mildly compromised from body habitus and prominent motion artifact at the lung bases. Smaller pulmonary arteries obscured. No pulmonary emboli identified. Thoracic aorta is negative for dissection. No CT evidence of right heart   strain.    LUNGS AND PLEURA: Extensive groundglass opacity and developing consolidation most prominent in right lower lobe and to a much less extent the remainder of bilateral hemithoraces. No effusion.    MEDIASTINUM/AXILLAE: Mild right hilar adenopathy.    CORONARY ARTERY CALCIFICATION: None.    UPPER ABDOMEN: Hepatic steatosis.    MUSCULOSKELETAL: Normal.      Impression    IMPRESSION:  1.  Areas of groundglass opacity and developing consolidation most prominent in the right lower lobe with right hilar adenopathy likely infectious or inflammatory. Follow-up recommended in 3 months to ensure resolution and exclude other underlying   pathology.  2.  Hepatic steatosis.  3.  No pulmonary embolus, aortic aneurysm or dissection.    Commonly reported imaging features of (COVID-19) pneumonia are present. Influenza pneumonia and organizing pneumonia as can be seen in the setting of  drug toxicity and connective tissue disease can cause a similar imaging pattern.         Parminder Daley MD

## 2021-08-24 LAB
ALBUMIN SERPL-MCNC: 3.3 G/DL (ref 3.4–5)
ALP SERPL-CCNC: 55 U/L (ref 40–150)
ALT SERPL W P-5'-P-CCNC: 72 U/L (ref 0–50)
ANION GAP SERPL CALCULATED.3IONS-SCNC: 5 MMOL/L (ref 3–14)
AST SERPL W P-5'-P-CCNC: 35 U/L (ref 0–45)
BILIRUB SERPL-MCNC: 0.3 MG/DL (ref 0.2–1.3)
BUN SERPL-MCNC: 12 MG/DL (ref 7–30)
CALCIUM SERPL-MCNC: 9.1 MG/DL (ref 8.5–10.1)
CHLORIDE BLD-SCNC: 106 MMOL/L (ref 94–109)
CO2 SERPL-SCNC: 27 MMOL/L (ref 20–32)
CREAT SERPL-MCNC: 0.67 MG/DL (ref 0.52–1.04)
CRP SERPL-MCNC: 24.3 MG/L (ref 0–8)
D DIMER PPP FEU-MCNC: 0.7 UG/ML FEU (ref 0–0.5)
ERYTHROCYTE [DISTWIDTH] IN BLOOD BY AUTOMATED COUNT: 13.8 % (ref 10–15)
GFR SERPL CREATININE-BSD FRML MDRD: >90 ML/MIN/1.73M2
GLUCOSE BLD-MCNC: 163 MG/DL (ref 70–99)
GLUCOSE BLDC GLUCOMTR-MCNC: 173 MG/DL (ref 70–99)
GLUCOSE BLDC GLUCOMTR-MCNC: 194 MG/DL (ref 70–99)
GLUCOSE BLDC GLUCOMTR-MCNC: 206 MG/DL (ref 70–99)
GLUCOSE BLDC GLUCOMTR-MCNC: 224 MG/DL (ref 70–99)
GLUCOSE BLDC GLUCOMTR-MCNC: 247 MG/DL (ref 70–99)
HCT VFR BLD AUTO: 42 % (ref 35–47)
HGB BLD-MCNC: 13.2 G/DL (ref 11.7–15.7)
MCH RBC QN AUTO: 24.2 PG (ref 26.5–33)
MCHC RBC AUTO-ENTMCNC: 31.4 G/DL (ref 31.5–36.5)
MCV RBC AUTO: 77 FL (ref 78–100)
PLATELET # BLD AUTO: 302 10E3/UL (ref 150–450)
POTASSIUM BLD-SCNC: 3.8 MMOL/L (ref 3.4–5.3)
PROT SERPL-MCNC: 8.4 G/DL (ref 6.8–8.8)
RBC # BLD AUTO: 5.46 10E6/UL (ref 3.8–5.2)
SODIUM SERPL-SCNC: 138 MMOL/L (ref 133–144)
WBC # BLD AUTO: 11.9 10E3/UL (ref 4–11)

## 2021-08-24 PROCEDURE — 94640 AIRWAY INHALATION TREATMENT: CPT | Mod: 76

## 2021-08-24 PROCEDURE — 250N000011 HC RX IP 250 OP 636: Performed by: INTERNAL MEDICINE

## 2021-08-24 PROCEDURE — 999N000157 HC STATISTIC RCP TIME EA 10 MIN

## 2021-08-24 PROCEDURE — 250N000013 HC RX MED GY IP 250 OP 250 PS 637: Performed by: INTERNAL MEDICINE

## 2021-08-24 PROCEDURE — 85027 COMPLETE CBC AUTOMATED: CPT | Performed by: INTERNAL MEDICINE

## 2021-08-24 PROCEDURE — 99233 SBSQ HOSP IP/OBS HIGH 50: CPT | Performed by: INTERNAL MEDICINE

## 2021-08-24 PROCEDURE — 250N000012 HC RX MED GY IP 250 OP 636 PS 637: Performed by: INTERNAL MEDICINE

## 2021-08-24 PROCEDURE — 36415 COLL VENOUS BLD VENIPUNCTURE: CPT | Performed by: INTERNAL MEDICINE

## 2021-08-24 PROCEDURE — 86140 C-REACTIVE PROTEIN: CPT | Performed by: INTERNAL MEDICINE

## 2021-08-24 PROCEDURE — 80053 COMPREHEN METABOLIC PANEL: CPT | Performed by: INTERNAL MEDICINE

## 2021-08-24 PROCEDURE — 94640 AIRWAY INHALATION TREATMENT: CPT

## 2021-08-24 PROCEDURE — 120N000004 HC R&B MS OVERFLOW

## 2021-08-24 PROCEDURE — 258N000003 HC RX IP 258 OP 636: Performed by: INTERNAL MEDICINE

## 2021-08-24 PROCEDURE — 250N000009 HC RX 250: Performed by: INTERNAL MEDICINE

## 2021-08-24 PROCEDURE — 85379 FIBRIN DEGRADATION QUANT: CPT | Performed by: INTERNAL MEDICINE

## 2021-08-24 RX ADMIN — ENOXAPARIN SODIUM 40 MG: 40 INJECTION SUBCUTANEOUS at 19:13

## 2021-08-24 RX ADMIN — ENOXAPARIN SODIUM 40 MG: 40 INJECTION SUBCUTANEOUS at 10:12

## 2021-08-24 RX ADMIN — DEXAMETHASONE 6 MG: 2 TABLET ORAL at 10:11

## 2021-08-24 RX ADMIN — INSULIN ASPART 1 UNITS: 100 INJECTION, SOLUTION INTRAVENOUS; SUBCUTANEOUS at 22:15

## 2021-08-24 RX ADMIN — ALBUTEROL SULFATE 2 PUFF: 90 AEROSOL, METERED RESPIRATORY (INHALATION) at 15:36

## 2021-08-24 RX ADMIN — ATORVASTATIN CALCIUM 40 MG: 40 TABLET, FILM COATED ORAL at 19:13

## 2021-08-24 RX ADMIN — AMLODIPINE BESYLATE 5 MG: 5 TABLET ORAL at 10:13

## 2021-08-24 RX ADMIN — ALBUTEROL SULFATE 2 PUFF: 90 AEROSOL, METERED RESPIRATORY (INHALATION) at 07:41

## 2021-08-24 RX ADMIN — ALBUTEROL SULFATE 2 PUFF: 90 AEROSOL, METERED RESPIRATORY (INHALATION) at 19:14

## 2021-08-24 RX ADMIN — METFORMIN HYDROCHLORIDE 1000 MG: 500 TABLET, FILM COATED ORAL at 17:27

## 2021-08-24 RX ADMIN — METFORMIN HYDROCHLORIDE 1000 MG: 500 TABLET, FILM COATED ORAL at 10:10

## 2021-08-24 RX ADMIN — SODIUM CHLORIDE 50 ML: 9 INJECTION, SOLUTION INTRAVENOUS at 17:32

## 2021-08-24 RX ADMIN — INSULIN HUMAN 10 UNITS: 100 INJECTION, SUSPENSION SUBCUTANEOUS at 10:16

## 2021-08-24 RX ADMIN — GLIPIZIDE 5 MG: 5 TABLET, FILM COATED, EXTENDED RELEASE ORAL at 10:10

## 2021-08-24 RX ADMIN — ALBUTEROL SULFATE 2 PUFF: 90 AEROSOL, METERED RESPIRATORY (INHALATION) at 11:24

## 2021-08-24 RX ADMIN — FENOFIBRATE 160 MG: 160 TABLET, FILM COATED ORAL at 10:12

## 2021-08-24 RX ADMIN — LISINOPRIL 40 MG: 40 TABLET ORAL at 10:11

## 2021-08-24 RX ADMIN — REMDESIVIR 100 MG: 100 INJECTION, POWDER, LYOPHILIZED, FOR SOLUTION INTRAVENOUS at 17:27

## 2021-08-24 NOTE — PLAN OF CARE
Temp: 99.2  F (37.3  C) Temp src: Oral BP: 135/71 Pulse: 107   Resp: 18 SpO2: 95 % O2 Device: Nasal cannula Oxygen Delivery: 3 LPM    Pt up Independently. Primo speaking. Complains of cough, prn robitussin and tessalon given. Blood sugars 316 and 156. Regular diet, family brings in food. Denies sob at rest but is sob with activity. Denies pain. Plan- remdesivir day 2/5, monitor LFTs daily, po decadron day 2/10, wean O2 as able, prn cough meds available, scheduled inhalers.

## 2021-08-24 NOTE — PROGRESS NOTES
"/58 (BP Location: Right arm)   Pulse 107   Temp 98.8  F (37.1  C) (Oral)   Resp 20   Ht 1.626 m (5' 4\")   Wt 114.4 kg (252 lb 1.6 oz)   SpO2 98%   BMI 43.27 kg/m    Patient alert and oriented. Speaks Persian,  ipad in the room. Denied cough overnight. Denies shortness of breath at rest. 3L of O2, unable to ween overnight. Plan to continue remdesivir, decadron and Lovenox today. PRN cough meds. Patient sleeping comfortably in between cares.  "

## 2021-08-24 NOTE — PROGRESS NOTES
[facility]  Hospitalist Progress Note  Meliza Gonzalez MD 08/24/21  Text Page  Pager: [phone] (7am-6pm)    Reason for Stay (Diagnosis): COVID-19         Assessment and Plan:      Summary of Stay: Kay Lopez is a 30 year old [place] speaking female with past medical history of DM2 (non insulin dependent), HTN, obesity, and HLD admitted on 8/22/2021 with 3 days of cough and shortness of breath and found to be positive for COVID-19.  Initially presented to a different ER and was prescribed cefdinir and azithromycin for pneumonia.  Return due to worsening symptoms.  D-dimer elevated at 1.  CTPA showed bilateral groundglass opacities, most prominent in the right lower lobe with right hilar adenopathy.  She was admitted due to hypoxia.  She has been started on IV Remdesevir and dexamethasone.  Overall improving but still hypoxic.    Problem List/Assessment and Plan:     Acute hypoxemic respiratory failure secondary to COVID-19 pneumonia: Ill for 3 days PTA.  Primary symptoms of cough and shortness of breath.  Positive for COVID-19 8/22.  CTPA shows bilateral groundglass opacities, most prominent in the right lower lobe consistent with COVID-19 pneumonia.  -Continue IV remdesivir day 3/5, mild elevation  and  on presentation, trend LFTs while on Remdesevir daily  -Continue oral dexamethasone 6 mg daily day 3/10  -Continue supplemental oxygen, wean as able, pulse oximetry  -Guaifenesin Robitussin as needed for cough  -Lovenox for DVT prophylaxis     Type II DM: PTA on Metformin 1000 mg twice daily and glipizide XL 5 mg daily.  A1c 7.5.  Risk for hyperglycemia with dexamethasone.  Has continued hyperglycemia.  Added NPH given significant hyperglycemia which has helped.  -Continue glipizide  -Continue Metformin this evening  -Medium dose sliding scale insulin  -NPH 10 units daily with Decadron dose     HTN: Resume PTA lisinopril 40 mg daily and amlodipine 5 mg daily.     Obesity: BMI  "43.     HLD: Resume fenofibrate and atorvastatin.    Diet: Combination Diet Regular Diet Adult    DVT Prophylaxis: Enoxaparin (Lovenox) SQ  Wilkerson Catheter: Not present  Code Status: Full Code      Disposition Plan   Expected discharge: 08/25/2021 recommended to prior living arrangement once stable oxygen requirements.  Entered: Meliza Gonzalez MD 08/24/2021, 10:18 AM       The patient's care was discussed with the Bedside Nurse, Care Coordinator/ and Patient.    Hospitalist Service  [facility]          Interval History (Subjective):      Patient was seen using a professional Czech  on the iPad.  Patient states she overall is feeling better than admission.  She has a lot of dry coughing.  At rest she does not feel short of breath but when she moves around she does.  No nausea or vomiting.  She is eating and drinking well.  No diarrhea, constipation or abdominal pain.  She is wondering when she will be able to go home.  We discussed the care plan and all of her questions were answered.                  Physical Exam:      Last Vital Signs:  /65 (BP Location: Right arm)   Pulse 96   Temp 98.5  F (36.9  C) (Oral)   Resp 20   Ht 1.626 m (5' 4\")   Wt 114.4 kg (252 lb 1.6 oz)   SpO2 94%   BMI 43.27 kg/m      General: Alert, awake, no acute distress.  Sitting up in a chair.  HEENT: Normocephalic and atraumatic, eyes anicteric and without scleral injection, EOMI, face symmetric, MMM.  Cardiac: RRR, normal S1, S2. No m/g/r, no LE edema.  Pulmonary: Normal chest rise, normal work of breathing.  Lungs CTAB without crackles or wheezing.  Dry coughing throughout exam.  Abdomen: soft, non-tender, non-distended.  Normoactive bowel sounds, no guarding or rebound tenderness.  Extremities: no deformities.  Warm, well perfused.  Skin: no rashes or lesions.  Warm and Dry.  Neuro: No focal deficits.  Speech clear.  Coordination and strength grossly normal.  Psych: Alert and " oriented x3. Appropriate affect.         Medications:      All current medications were reviewed with changes reflected in problem list.         Data:      All new lab and imaging data was reviewed.   Labs:  Recent Labs   Lab 08/24/21  0700 08/23/21  1208 08/22/21  1500   WBC 11.9* 10.0 6.5   HGB 13.2 13.2 14.0   HCT 42.0 41.5 43.6   MCV 77* 76* 78    232 228     Recent Labs   Lab 08/24/21  0903 08/24/21  0700 08/24/21  0144 08/23/21  1003 08/22/21  2154 08/22/21  1500   NA  --  138  --  135  --  134   POTASSIUM  --  3.8  --  4.3  --  3.6   CHLORIDE  --  106  --  106  --  101   CO2  --  27  --  21  --  26   ANIONGAP  --  5  --  8  --  7   * 163* 194* 208*  --  151*   BUN  --  12  --  10  --  4*   CR  --  0.67  --  0.60  --  0.68   GFRESTIMATED  --  >90  --  >90  --  >90   LAURA  --  9.1  --  8.9  --  9.0   PROTTOTAL  --  8.4  --   --  8.1  --    ALBUMIN  --  3.3*  --   --  3.4  --    BILITOTAL  --  0.3  --   --  0.6  --    ALKPHOS  --  55  --   --  60  --    AST  --  35  --   --  110*  --    ALT  --  72*  --   --  105*  --      Recent Labs   Lab 08/24/21  0700   DD 0.70*     Recent Labs   Lab 08/24/21  0700 08/23/21  1003   CRP 24.3* 46.8*     Recent Labs   Lab 08/22/21  1500   TROPONIN <0.015      Imaging:   No results found for this or any previous visit (from the past 24 hour(s)).    Meliza Gonzalez MD

## 2021-08-24 NOTE — PLAN OF CARE
"AOx3. Up ad elizabet. COVID precautions maintained. O2 sats 94-98% on 3L O2 NC. Infrequent cough- non-productive. Slight SOB with exertion-relieved with rest per pt report. Respiratory following. Utilizing [employer] - Ipad in room. BG-173 and 206, tolerating PO. Plan to continue to monitor and provide supportive cares./71 (BP Location: Right arm)   Pulse 93   Temp 98.7  F (37.1  C) (Oral)   Resp 18   Ht 1.626 m (5' 4\")   Wt 114.4 kg (252 lb 1.6 oz)   SpO2 94%   BMI 43.27 kg/m     "

## 2021-08-25 VITALS
BODY MASS INDEX: 43.04 KG/M2 | DIASTOLIC BLOOD PRESSURE: 88 MMHG | HEIGHT: 64 IN | TEMPERATURE: 98.1 F | OXYGEN SATURATION: 96 % | SYSTOLIC BLOOD PRESSURE: 148 MMHG | WEIGHT: 252.1 LBS | HEART RATE: 86 BPM | RESPIRATION RATE: 16 BRPM

## 2021-08-25 LAB
ALBUMIN SERPL-MCNC: 3.1 G/DL (ref 3.4–5)
ALP SERPL-CCNC: 53 U/L (ref 40–150)
ALT SERPL W P-5'-P-CCNC: 60 U/L (ref 0–50)
ANION GAP SERPL CALCULATED.3IONS-SCNC: 4 MMOL/L (ref 3–14)
AST SERPL W P-5'-P-CCNC: 25 U/L (ref 0–45)
BILIRUB SERPL-MCNC: 0.3 MG/DL (ref 0.2–1.3)
BUN SERPL-MCNC: 12 MG/DL (ref 7–30)
CALCIUM SERPL-MCNC: 8.8 MG/DL (ref 8.5–10.1)
CHLORIDE BLD-SCNC: 106 MMOL/L (ref 94–109)
CO2 SERPL-SCNC: 28 MMOL/L (ref 20–32)
CREAT SERPL-MCNC: 0.62 MG/DL (ref 0.52–1.04)
CRP SERPL-MCNC: 14 MG/L (ref 0–8)
D DIMER PPP FEU-MCNC: 0.53 UG/ML FEU (ref 0–0.5)
ERYTHROCYTE [DISTWIDTH] IN BLOOD BY AUTOMATED COUNT: 13.7 % (ref 10–15)
GFR SERPL CREATININE-BSD FRML MDRD: >90 ML/MIN/1.73M2
GLUCOSE BLD-MCNC: 191 MG/DL (ref 70–99)
GLUCOSE BLDC GLUCOMTR-MCNC: 169 MG/DL (ref 70–99)
HCT VFR BLD AUTO: 40.9 % (ref 35–47)
HGB BLD-MCNC: 12.9 G/DL (ref 11.7–15.7)
MCH RBC QN AUTO: 24.3 PG (ref 26.5–33)
MCHC RBC AUTO-ENTMCNC: 31.5 G/DL (ref 31.5–36.5)
MCV RBC AUTO: 77 FL (ref 78–100)
PLATELET # BLD AUTO: 282 10E3/UL (ref 150–450)
POTASSIUM BLD-SCNC: 3.7 MMOL/L (ref 3.4–5.3)
PROT SERPL-MCNC: 7.9 G/DL (ref 6.8–8.8)
RBC # BLD AUTO: 5.3 10E6/UL (ref 3.8–5.2)
SODIUM SERPL-SCNC: 138 MMOL/L (ref 133–144)
WBC # BLD AUTO: 8.6 10E3/UL (ref 4–11)

## 2021-08-25 PROCEDURE — 85379 FIBRIN DEGRADATION QUANT: CPT | Performed by: INTERNAL MEDICINE

## 2021-08-25 PROCEDURE — 250N000013 HC RX MED GY IP 250 OP 250 PS 637: Performed by: INTERNAL MEDICINE

## 2021-08-25 PROCEDURE — 86140 C-REACTIVE PROTEIN: CPT | Performed by: INTERNAL MEDICINE

## 2021-08-25 PROCEDURE — 250N000011 HC RX IP 250 OP 636: Performed by: INTERNAL MEDICINE

## 2021-08-25 PROCEDURE — 250N000012 HC RX MED GY IP 250 OP 636 PS 637: Performed by: INTERNAL MEDICINE

## 2021-08-25 PROCEDURE — 99239 HOSP IP/OBS DSCHRG MGMT >30: CPT | Performed by: INTERNAL MEDICINE

## 2021-08-25 PROCEDURE — 80053 COMPREHEN METABOLIC PANEL: CPT | Performed by: INTERNAL MEDICINE

## 2021-08-25 PROCEDURE — 999N000157 HC STATISTIC RCP TIME EA 10 MIN

## 2021-08-25 PROCEDURE — 36415 COLL VENOUS BLD VENIPUNCTURE: CPT | Performed by: INTERNAL MEDICINE

## 2021-08-25 PROCEDURE — 94640 AIRWAY INHALATION TREATMENT: CPT

## 2021-08-25 PROCEDURE — 85027 COMPLETE CBC AUTOMATED: CPT | Performed by: INTERNAL MEDICINE

## 2021-08-25 RX ORDER — DEXAMETHASONE 6 MG/1
6 TABLET ORAL DAILY
Qty: 6 TABLET | Refills: 0 | Status: SHIPPED | OUTPATIENT
Start: 2021-08-26

## 2021-08-25 RX ORDER — BENZONATATE 100 MG/1
100 CAPSULE ORAL 3 TIMES DAILY PRN
Qty: 30 CAPSULE | Refills: 0 | Status: SHIPPED | OUTPATIENT
Start: 2021-08-25

## 2021-08-25 RX ADMIN — AMLODIPINE BESYLATE 5 MG: 5 TABLET ORAL at 10:06

## 2021-08-25 RX ADMIN — ALBUTEROL SULFATE 2 PUFF: 90 AEROSOL, METERED RESPIRATORY (INHALATION) at 12:20

## 2021-08-25 RX ADMIN — GUAIFENESIN 10 ML: 100 SOLUTION ORAL at 12:19

## 2021-08-25 RX ADMIN — FENOFIBRATE 160 MG: 160 TABLET, FILM COATED ORAL at 10:06

## 2021-08-25 RX ADMIN — LISINOPRIL 40 MG: 40 TABLET ORAL at 10:06

## 2021-08-25 RX ADMIN — METFORMIN HYDROCHLORIDE 1000 MG: 500 TABLET, FILM COATED ORAL at 10:06

## 2021-08-25 RX ADMIN — INSULIN HUMAN 10 UNITS: 100 INJECTION, SUSPENSION SUBCUTANEOUS at 10:09

## 2021-08-25 RX ADMIN — DEXAMETHASONE 6 MG: 2 TABLET ORAL at 10:06

## 2021-08-25 RX ADMIN — GLIPIZIDE 5 MG: 5 TABLET, FILM COATED, EXTENDED RELEASE ORAL at 10:06

## 2021-08-25 RX ADMIN — ALBUTEROL SULFATE 2 PUFF: 90 AEROSOL, METERED RESPIRATORY (INHALATION) at 08:47

## 2021-08-25 RX ADMIN — ENOXAPARIN SODIUM 40 MG: 40 INJECTION SUBCUTANEOUS at 10:06

## 2021-08-25 NOTE — PLAN OF CARE
"Blood pressure 118/71, pulse 93, temperature 98.7  F (37.1  C), temperature source Oral, resp. rate 18, height 1.626 m (5' 4\"), weight 114.4 kg (252 lb 1.6 oz), SpO2 94 %, currently breastfeeding.    Neuro:  Patient is alert, orientated x 4, pleasant, cooperative with cares.  Patient speaks minimal English; Interpretor phone in room.    Pulm:   LS diminished in bases.  Sats 93-96% on 3 L nasal cannula.  Patient is sob with minimal exertion; however recovers quickly.  Patient has a course cough, producing minimal sputum.   CV:      WDL  GI:       Patient tolerating evening meal tray without nausea or emesis.  .    Coverage given per sliding scale in addition to Metformin.  :     WDL  Plan:    Remdesivir to continue.  Wean 02 as able.  Continue to provide supportive cares. Covid/Respiratory Isolation    "

## 2021-08-25 NOTE — DISCHARGE SUMMARY
[facility]  Discharge Summary  Name: Kay Lopez    MRN: [id number]  YOB: 1990    Age: 30 year old  Date of Discharge:  8/25/2021  Date of Admission: 8/22/2021  Primary Care Provider: [facility], [place]  Discharge Physician:  Meliza Gonzalez MD  Discharging Service:  Hospitalist      Discharge Diagnoses:  1.  Acute hypoxic respiratory failure secondary to COVID-19 viral pneumonia  2.  Type 2 diabetes  3.  Hypertension  4.  Obesity  5.  Hyperlipidemia     Follow-ups Needed After Discharge   Follow-up with PCP within 1 week    Unresulted Labs Ordered in the Past 30 Days of this Admission     No orders found from 10/16/2018 to 12/16/2018.        Hospital Course:  Kay Lopez is a 30 year old [place] speaking female with past medical history of DM2 (non insulin dependent), HTN, obesity, and HLD admitted on 8/22/2021 with 3 days of cough and shortness of breath and found to be positive for COVID-19.  Initially presented to a different ER and was prescribed cefdinir and azithromycin for pneumonia.  Return due to worsening symptoms.  D-dimer elevated at 1.  CTPA showed bilateral groundglass opacities, most prominent in the right lower lobe with right hilar adenopathy.  She was admitted due to hypoxia.  She was started on IV Remdesevir and dexamethasone.  By the day of discharge she was improving and no longer hypoxic.     Acute hypoxemic respiratory failure secondary to COVID-19 pneumonia: Ill for 3 days PTA.  Primary symptoms of cough and shortness of breath.  Positive for COVID-19 8/22.  CTPA shows bilateral groundglass opacities, most prominent in the right lower lobe consistent with COVID-19 pneumonia.  She was treated with Remdesevir for 4 days as well as Decadron.  She will complete a total of 10-day course of Decadron (6 days upon discharge).  As needed antitussives also prescribed.  She did require supplemental oxygen but by the day of discharge was on room air.  She was  "treated with Lovenox for prophylaxis while inpatient but is ambulatory so will not be prescribed anticoagulation at time of discharge.  I did discuss with her that she should be vaccinated for COVID-19 once she recovers from her acute illness.     Type II DM: PTA on Metformin 1000 mg twice daily and glipizide XL 5 mg daily.  A1c 7.5.  Risk for hyperglycemia with dexamethasone.  Has continued hyperglycemia.  She was started on NPH insulin with her Decadron.  At discharge she will continue her PTA oral agents as well as NPH 14 units daily with Decadron which will be continued for 1 more day after her Decadron is completed.     Obesity: BMI 43.  Complicates cares.     HLD: Resume fenofibrate and atorvastatin.    Hypertension: Continue PTA amlodipine and lisinopril.     Discharge Disposition:  Discharged to home     Allergies:  Allergies   Allergen Reactions     Azithromycin      anaphylaxis        Condition on Discharge:  Discharge condition: Stable   Discharge vitals: Blood pressure 135/77, pulse 85, temperature 98  F (36.7  C), temperature source Oral, resp. rate 18, height 1.626 m (5' 4\"), weight 114.4 kg (252 lb 1.6 oz), SpO2 95 %, currently breastfeeding.   Code status on discharge: Full Code     History of Illness:  See detailed admission note for full details.    Physical Exam:  Blood pressure 135/77, pulse 85, temperature 98  F (36.7  C), temperature source Oral, resp. rate 18, height 1.626 m (5' 4\"), weight 114.4 kg (252 lb 1.6 oz), SpO2 95 %, currently breastfeeding.  Wt Readings from Last 1 Encounters:   08/22/21 114.4 kg (252 lb 1.6 oz)     General: Alert, awake, no acute distress.  HEENT: Normocephalic, atraumatic, eyes anicteric and without scleral injection, EOMI, MMM.  Cardiac: RRR, normal S1, S2.  No m/g/r. No LE edema.  Pulmonary: Normal chest rise, normal work of breathing.  Lungs CTAB without crackles or wheezing.  Abdomen: soft, non-tender, non-distended.  Normoactive BS.  No guarding or rebound " tenderness.  Extremities: no deformities.  Warm, well perfused.  Skin: no rashes or lesions noted.  Warm and Dry.  Neuro: No focal deficits noted.  Speech clear.  Coordination and strength grossly normal.  Ambulated around her room with continuous pulse ox and remained 94% or higher.  Psych: Appropriate affect.  Alert and oriented x3.    Procedures other than Imaging:  Phlebotomy     Imaging:  Results for orders placed or performed during the hospital encounter of 08/22/21   CT Chest Pulmonary Embolism w Contrast    Narrative    EXAM: CT CHEST PULMONARY EMBOLISM W CONTRAST  LOCATION: [facility]  DATE/TIME: 8/22/2021 4:56 PM    INDICATION: Shortness of breath, cough, tachycardia.  COMPARISON: None.  TECHNIQUE: CT chest pulmonary angiogram during arterial phase injection of IV contrast. Multiplanar reformats and MIP reconstructions were performed. Dose reduction techniques were used.   CONTRAST: 84 mL Isovue-370.    FINDINGS:  ANGIOGRAM CHEST: Exam mildly compromised from body habitus and prominent motion artifact at the lung bases. Smaller pulmonary arteries obscured. No pulmonary emboli identified. Thoracic aorta is negative for dissection. No CT evidence of right heart   strain.    LUNGS AND PLEURA: Extensive groundglass opacity and developing consolidation most prominent in right lower lobe and to a much less extent the remainder of bilateral hemithoraces. No effusion.    MEDIASTINUM/AXILLAE: Mild right hilar adenopathy.    CORONARY ARTERY CALCIFICATION: None.    UPPER ABDOMEN: Hepatic steatosis.    MUSCULOSKELETAL: Normal.      Impression    IMPRESSION:  1.  Areas of groundglass opacity and developing consolidation most prominent in the right lower lobe with right hilar adenopathy likely infectious or inflammatory. Follow-up recommended in 3 months to ensure resolution and exclude other underlying   pathology.  2.  Hepatic steatosis.  3.  No pulmonary embolus, aortic aneurysm or  dissection.    Commonly reported imaging features of (COVID-19) pneumonia are present. Influenza pneumonia and organizing pneumonia as can be seen in the setting of drug toxicity and connective tissue disease can cause a similar imaging pattern.        Consultations:  Consultations This Hospital Stay   None     Recent Lab Results:  Recent Labs   Lab 08/25/21  0650 08/24/21  0700 08/23/21  1208   WBC 8.6 11.9* 10.0   HGB 12.9 13.2 13.2   HCT 40.9 42.0 41.5   MCV 77* 77* 76*    302 232     Recent Labs   Lab 08/25/21  0650 08/25/21  0157 08/24/21  2135 08/24/21  0700 08/23/21  1003 08/22/21  2154     --   --  138 135  --    POTASSIUM 3.7  --   --  3.8 4.3  --    CHLORIDE 106  --   --  106 106  --    CO2 28  --   --  27 21  --    ANIONGAP 4  --   --  5 8  --    * 169* 224* 163* 208*  --    BUN 12  --   --  12 10  --    CR 0.62  --   --  0.67 0.60  --    GFRESTIMATED >90  --   --  >90 >90  --    LAURA 8.8  --   --  9.1 8.9  --    PROTTOTAL 7.9  --   --  8.4  --  8.1   ALBUMIN 3.1*  --   --  3.3*  --  3.4   BILITOTAL 0.3  --   --  0.3  --  0.6   ALKPHOS 53  --   --  55  --  60   AST 25  --   --  35  --  110*   ALT 60*  --   --  72*  --  105*     Recent Labs   Lab 08/25/21  0650   DD 0.53*     Recent Labs   Lab 08/25/21  0650 08/24/21  0700 08/23/21  1003   CRP 14.0* 24.3* 46.8*     Recent Labs   Lab 08/22/21  1500   TROPONIN <0.015          Pending Results:    Unresulted Labs Ordered in the Past 30 Days of this Admission     No orders found from 7/23/2021 to 8/23/2021.           Discharge Instructions and Follow-Up:   Discharge Orders      COVID-19 Get[facility] Loop Referral      Reason for your hospital stay    You were hospitalized for COVID-19 viral pneumonia.  Your oxygen levels improved and you no longer are requiring oxygen at the time of your discharge.     Contact provider    Contact your primary care provider if If increased trouble breathing, new arm/leg swelling, dizziness/passing out, falls,  bleeding that doesn't stop, or uncontrolled pain.     Follow-up and recommended labs and tests     Follow up with primary care provider, [place] [facility], within 7 days for hospital follow- up.  No follow up labs or test are needed.     Discharge - Quarantine/Isolation Instruction    Date of symptom onset:     Date of first positive test:    Stay home and away from others (self-isolate) for at least 20 days from when your symptoms started And...   You've had no fevers and no medicine that reduces fever for 1 full day (24 hours)  And...   Your other symptoms have resolved (gotten better).     Activity    Your activity upon discharge: activity as tolerated     Diet    Follow this diet upon discharge: Orders Placed This Encounter      Combination Diet Regular Diet Adult     Discharge Medications   Current Discharge Medication List      START taking these medications    Details   benzonatate (TESSALON) 100 MG capsule Take 1 capsule (100 mg) by mouth 3 times daily as needed for cough  Qty: 30 capsule, Refills: 0    Associated Diagnoses: Pneumonia due to 2019 novel coronavirus      dexamethasone (DECADRON) 6 MG tablet Take 1 tablet (6 mg) by mouth daily  Qty: 6 tablet, Refills: 0    Associated Diagnoses: Pneumonia due to 2019 novel coronavirus      guaiFENesin (ROBITUSSIN) 20 mg/mL SOLN solution Take 10 mLs by mouth every 4 hours as needed for cough  Qty: 236 mL, Refills: 0    Associated Diagnoses: Pneumonia due to 2019 novel coronavirus      insulin  UNIT/ML injection Inject 14 Units Subcutaneous every morning for 7 days  Qty: 0.98 mL, Refills: 0    Associated Diagnoses: Pneumonia due to 2019 novel coronavirus         CONTINUE these medications which have NOT CHANGED    Details   amLODIPine (NORVASC) 5 MG tablet Take 5 mg by mouth daily      atorvastatin (LIPITOR) 40 MG tablet Take 40 mg by mouth daily      fenofibrate (TRICOR) 145 MG tablet Take 145 mg by mouth daily      glipiZIDE (GLUCOTROL XL) 5 MG 24  hr tablet Take 5 mg by mouth daily      lisinopril (ZESTRIL) 40 MG tablet Take 40 mg by mouth daily      metFORMIN (GLUCOPHAGE) 1000 MG tablet Take 1,000 mg by mouth 2 times daily (with meals)             Time Spent on this Encounter   I, Meliza Gonzalez MD, personally saw the patient today and spent greater than 30 minutes discharging this patient.    Meliza Gonzalez MD

## 2021-08-25 NOTE — PLAN OF CARE
"AOx4. Up ad elizabet. [place]riot  utilized throughout assessments.  Pt. Denies pain. Swapped out O2 sensor and patient's O2 sats >94% on RA even with ambulating in the room with new sensor- Provider notified. Pt reports some SOB right at the start of activity but states it improves right away.  LS diminished anterior and posterior but o/w clear. Dry/non-productive cough, frequent. Bloody nose this am which resolved quickly-provider aware d/t lovenox being administered and verbal ok to administer still.  Insulin injection teaching done with patient and pt's  over the phone in anticipation of discharge home on insulin later today.  Pt. Demonstrated understanding of insulin injection. Plan to discharge home later today. BP (!) 148/88 (BP Location: Right arm)   Pulse 86   Temp 98.1  F (36.7  C) (Oral)   Resp 16   Ht 1.626 m (5' 4\")   Wt 114.4 kg (252 lb 1.6 oz)   SpO2 96%   BMI 43.27 kg/m     "

## 2021-08-25 NOTE — PLAN OF CARE
Patient's After Visit Summary was reviewed with patient and/or spouse.   Patient verbalized understanding of After Visit Summary, recommended follow up and was given an opportunity to ask questions.   Discharge medications sent home with patient/family: YES   Discharged with spouse    OBSERVATION patient END time: 1400  VSS. W/c ride provided. Discharge instructions gone over with  and pt using . Pt. And  both demonstrated and voiced understanding about insulin administration.

## 2021-08-25 NOTE — DISCHARGE INSTRUCTIONS
1. You will take Decadron in the morning for 6 more days (starting 8/26/21).  2. Take 14 units NPH insulin daily with your Decadron plus the day after you finish Decadron (for total of 7 days).

## 2021-08-25 NOTE — PLAN OF CARE
"Care from [id number]    Inpatient Progress Note:  For complete assessment see flow sheet documentation.    /59 (BP Location: Right arm)   Pulse 78   Temp 98.2  F (36.8  C) (Oral)   Resp 20   Ht 1.626 m (5' 4\")   Wt 114.4 kg (252 lb 1.6 oz)   SpO2 92%   BMI 43.27 kg/m         Orientation: A&O x 4  Neuro: WNL  Pain status: No pain   Activity: Independent within room  Peripheral edema: WNL  Resp: On 3L O2 NC. CO SOB, even with sitting/sleeping in chair. SpO2 between 92% to 96%.  Cardiac: WNL, radial pulse regular. Denies chest pain  GI: WNL  :  WNL  Skin: WNL  LDA: Left forearm peripheral IV- NS Locked  Pertinent Labs: CRP:24.3; D-Dimer: 0.70  Diet: Regular  Discharge Plan: Per hospitalist, recommended to prior living arrangement once stable oxygen requirements. Expected discharge 08/25/2021. Day 4 will be starting for Remdesivir.     Will continue to monitor and provide cares.     Inga Zheng RN   "

## 2024-11-11 NOTE — PHARMACY-ADMISSION MEDICATION HISTORY
Admission medication history interview status for this patient is complete. See [facility] admission navigator for allergy information, prior to admission medications and immunization status.     Medication history interview done, indicate source(s): Family  Medication history resources (including written lists, pill bottles, clinic record): spouse read off pill bottles  Pharmacy: [place], [place] (off [address])    Changes made to PTA medication list:  Added: all  Changed: none  Reported as Not Taking: none  Removed: Augmentin 875 mg, docusate, hydralazine, ibuprofen 800 mg, labetalol, lactobacillus, oxycodone-apap (all old and discontinued or complete)    Actions taken by pharmacist (provider contacted, etc): I called Kay's cell phone and spoke with her spouse/significant other, Phofay. Jose A confirmed all the meds we had on our PTA list were old and that's what the outside med list showed as well. I checked the outside med list and Phofay read off the pill bottles of Kay's. Phofay confirmed the 6 medications Kay takes and he said she took doses yesterday but nothing today.    Additional medication history information:None    Medication reconciliation/reorder completed by provider prior to medication history?  Y    Prior to Admission medications    Medication Sig Last Dose Taking? Auth Provider   amLODIPine (NORVASC) 5 MG tablet Take 5 mg by mouth daily 8/21/2021 Yes Reported, Patient   atorvastatin (LIPITOR) 40 MG tablet Take 40 mg by mouth daily 8/21/2021 Yes Reported, Patient   fenofibrate (TRICOR) 145 MG tablet Take 145 mg by mouth daily  Yes Reported, Patient   glipiZIDE (GLUCOTROL XL) 5 MG 24 hr tablet Take 5 mg by mouth daily 8/21/2021 Yes Reported, Patient   lisinopril (ZESTRIL) 40 MG tablet Take 40 mg by mouth daily 8/21/2021 Yes Reported, Patient   metFORMIN (GLUCOPHAGE) 1000 MG tablet Take 1,000 mg by mouth 2 times daily (with meals) 8/21/2021 Yes Reported, Patient         
No